# Patient Record
Sex: FEMALE | Race: WHITE | NOT HISPANIC OR LATINO | Employment: FULL TIME | URBAN - METROPOLITAN AREA
[De-identification: names, ages, dates, MRNs, and addresses within clinical notes are randomized per-mention and may not be internally consistent; named-entity substitution may affect disease eponyms.]

---

## 2024-06-14 ENCOUNTER — OFFICE VISIT (OUTPATIENT)
Dept: PRIMARY CARE | Facility: CLINIC | Age: 39
End: 2024-06-14
Payer: COMMERCIAL

## 2024-06-14 VITALS
DIASTOLIC BLOOD PRESSURE: 86 MMHG | HEART RATE: 87 BPM | RESPIRATION RATE: 18 BRPM | WEIGHT: 235 LBS | OXYGEN SATURATION: 96 % | BODY MASS INDEX: 37.77 KG/M2 | HEIGHT: 66 IN | SYSTOLIC BLOOD PRESSURE: 135 MMHG

## 2024-06-14 DIAGNOSIS — Z00.00 ANNUAL PHYSICAL EXAM: ICD-10-CM

## 2024-06-14 DIAGNOSIS — E11.9 TYPE 2 DIABETES MELLITUS WITHOUT COMPLICATION, WITHOUT LONG-TERM CURRENT USE OF INSULIN (MULTI): ICD-10-CM

## 2024-06-14 DIAGNOSIS — Z00.00 ROUTINE GENERAL MEDICAL EXAMINATION AT A HEALTH CARE FACILITY: Primary | ICD-10-CM

## 2024-06-14 LAB
CREAT UR-MCNC: 22 MG/DL (ref 20–320)
MICROALBUMIN UR-MCNC: <7 MG/L
MICROALBUMIN/CREAT UR: NORMAL MG/G{CREAT}

## 2024-06-14 PROCEDURE — 3079F DIAST BP 80-89 MM HG: CPT | Performed by: FAMILY MEDICINE

## 2024-06-14 PROCEDURE — 82570 ASSAY OF URINE CREATININE: CPT

## 2024-06-14 PROCEDURE — 3062F POS MACROALBUMINURIA REV: CPT | Performed by: FAMILY MEDICINE

## 2024-06-14 PROCEDURE — 3050F LDL-C >= 130 MG/DL: CPT | Performed by: FAMILY MEDICINE

## 2024-06-14 PROCEDURE — 99385 PREV VISIT NEW AGE 18-39: CPT | Performed by: FAMILY MEDICINE

## 2024-06-14 PROCEDURE — 82043 UR ALBUMIN QUANTITATIVE: CPT

## 2024-06-14 PROCEDURE — 3075F SYST BP GE 130 - 139MM HG: CPT | Performed by: FAMILY MEDICINE

## 2024-06-14 RX ORDER — TIRZEPATIDE 2.5 MG/.5ML
2.5 INJECTION, SOLUTION SUBCUTANEOUS
Qty: 2 ML | Refills: 0 | Status: SHIPPED | OUTPATIENT
Start: 2024-06-16

## 2024-06-14 RX ORDER — METFORMIN HYDROCHLORIDE 500 MG/1
1000 TABLET ORAL
Qty: 180 TABLET | Refills: 1 | Status: SHIPPED | OUTPATIENT
Start: 2024-06-14 | End: 2025-06-09

## 2024-06-14 RX ORDER — BLOOD-GLUCOSE SENSOR
EACH MISCELLANEOUS
Qty: 2 EACH | Refills: 3 | Status: SHIPPED | OUTPATIENT
Start: 2024-06-14

## 2024-06-14 ASSESSMENT — PATIENT HEALTH QUESTIONNAIRE - PHQ9
1. LITTLE INTEREST OR PLEASURE IN DOING THINGS: NOT AT ALL
SUM OF ALL RESPONSES TO PHQ9 QUESTIONS 1 AND 2: 0
2. FEELING DOWN, DEPRESSED OR HOPELESS: NOT AT ALL

## 2024-06-14 NOTE — PROGRESS NOTES
"  Subjective     Patient ID: Jessi Tejeda is a 39 y.o. female who presents for New Patient Visit (To establish care/).      Last Physical :4 Years ago     Pt's PMH, PSH, SH, FH , meds and allergies was obtained / reviewed and updated .     Dental visits : Y  Vision issues : N  Hearing issues : N    Immunizations : Y    Diet :  could be better  Exercise:  Weight concerns :     Alcohol: as noted in SH  Tobacco: as noted in SH  Recreational drug use : None/ as noted in SH    Sexually active : Active   Contraception :    Menstrual problems: none  Premenopausal/perimenopausal/ postmenopausal:      Parity:2  Full term:    Premature:   (s):   Living :  Ab induced:   Ab spontaneous :  Ectopic :   Multiple :    PAP smear :  Mammogram :  Colonoscopy:    Metabolic screening   - Lipid   - Glucose    Patient presnts as new onset High BG   BG 300s at home  Numbness of feet  H/o gestational diabetes  Was on glipizide  No blurry vison polyphagia polyuria     ======================================================    Visit Vitals  /86   Pulse 87   Resp 18   Ht 1.676 m (5' 6\")   Wt 107 kg (235 lb)   SpO2 96%   BMI 37.93 kg/m²   OB Status Having periods   Smoking Status Never   BSA 2.23 m²      No LMP recorded.     =====================================    Review of systems:  Constitutional: no chills, no fever and no night sweats.     Eyes: no blurred vision and no eyesight problems.     ENT: no hearing loss, no nasal congestion, no nasal discharge, no hoarseness and no sore throat.     Cardiovascular: no chest pain, no intermittent leg claudication, no lower extremity edema, no palpitations and no syncope.     Respiratory: no cough, no shortness of breath during exertion, no shortness of breath at rest and no wheezing.     Gastrointestinal: no abdominal pain, no blood in stools, no constipation, no diarrhea, no melena, no nausea, no rectal pain and no vomiting.     Genitourinary: no dysuria, no change in urinary " frequency, no urinary hesitancy, no feelings of urinary urgency and no vaginal discharge.     Musculoskeletal: no arthralgias, no back pain and no myalgias.     Integumentary: no new skin lesions and no rashes.     Neurological: no difficulty walking, no headache, no limb weakness, no numbness and no tingling.     Psychiatric: no anxiety, no depression, no anhedonia and no substance use disorders.     Endocrine: no recent weight gain and no recent weight loss.     Hematologic/Lymphatic: no tendency for easy bruising and no swollen glands.   ============================================================    Physical exam :    Constitutional: Alert and in no acute distress. Well developed, well nourished.     Eyes: Normal external exam. Pupils were equal in size, round, reactive to light (PERRL) with normal accommodation and extraocular movements intact (EOMI).     Ears, Nose, Mouth, and Throat: External inspection of ears and nose: Normal.  Otoscopic examination: Normal.      Neck: No neck mass was observed. Supple.     Cardiovascular: Heart rate and rhythm were normal, normal S1 and S2, no gallops, no murmurs and no pericardial rub    Pulmonary: No respiratory distress. Clear bilateral breath sounds.     Abdomen: Soft nontender; no abdominal mass palpated. No organomegaly.     Musculoskeletal: No joint swelling seen, normal movements of all extremities. Range of motion: Normal.  Muscle strength/tone: Normal.      Skin: Normal skin color and pigmentation, normal skin turgor, and no rash.     Neurologic: Deep tendon reflexes were 2+ and symmetric. Sensation: Normal.     Psychiatric: Judgment and insight: Intact. Mood and affect: Normal.    Lymphatic : Cervical/ axillary/ groin Lns Palpable/ non palpable            All other systems have been reviewed and are negative for complaint.      Assessment/Plan    Problem List Items Addressed This Visit             ICD-10-CM    Routine general medical examination at a [facility]  facility - [facility] Z00.00     Screening tests  - Mammograms: Women, 50-74 years old. Every 2 years.   - Pap smears:   --- Women, 21-29 years old. Cervical cytology only. Every 3 years.   --- Women, 30-65 years old. Cervical cytology + high risk HPV cotesting. Every 5 years.   - Colorectal cancer screening: Aged 45-75.   --- FIT testing: Every year  --- Cologuard: Every 3 years  --- Colonoscopy: Every 1- 10 years  - Low dose chest CT: 50-80 years old, with a 20 pack-year smoking history and currently smoke, or have quit within the past 15 years. Annually.  - DEXA scan: Women, 65+ years old. Every 2 years.  - Chlamydia/Gonorrhea testing:   --- All sexually active women <24 years old  --- Only sexually active women 25+ years old who are at increased risk for infection (or ask for it)  - Hep C: 18-79 years old. 1 time.  - HIV: 15-65 years old. 1 time.  - Abdominal aortic aneurysm screening (abdominal aortic ultrasound): Men, 65-75 years old, who have ever smoked. 1 time screening.  - PSA: Men, 40-70 years old. Annually.  - Labs: CBCd, CMP, lipid panel, +/- TSH, +/- PSA, +/- A1C    Adult Immunizations  - Flu  - COVID  - Tdap or Td booster: Every 10 years.  - Shingrix: 50+ years old, or 19+ years old and immunocompromised*. 2nd dose 2-6 months after 1st dose. One time series.  - Pneumonia:   --- 65+ years old. 1 dose PCV15 followed by PPSV23, OR 1 dose PCV20  --- 19+ years old with certain underlying medical conditions or other risk factors**. 1 dose PCV15 followed by a dose of PPSV23 given 8 weeks-1 year after the first dose. Or 1 dose PCV20.    Meds  - Baby aspirin: The USPSTF recommends initiating low-dose aspirin use for the primary prevention of cardiovascular disease and colorectal cancer in adults 50-59 years old who have a 10% or greater 10-year CVD risk, are not at increased risk for bleeding, have a life expectancy of at least 10 years, and are willing to take low-dose aspirin daily for at least 10 years.  -  Statins: Adults without a history of cardiovascular disease use a low to moderate dose statin for the prevention of CVD events and mortality when all of the following criteria are met: 1) they are aged 40 to 75 years, 2) they have 1 or more CVD risk factors (ie, dyslipidemia, diabetes, hypertension, or smoking), and 3) they have a calculated 10-year risk of a cardiovascular event of 10% or greater.   - Folic acid: All women who are capable of pregnancy take a daily supplement containing 400 to 800µg of folic acid           Type 2 diabetes mellitus without complication, without long-term current use of insulin (Multi) E11.9     Education provided re: diabetes pathophysiology and management, including effectiveness of diet, exercise, and medication in controlling sugar and preventing complications, as well as information on the complications and recommended care. I want your fasting morning blood sugar to be less than 100.   I want your sugar to be less than 140 two hours after a meal.  Limit simple sugars and refined foods from your diet. Cut out soda, juice and juice drinks, alcohol, candy, table sugar and any other source of refined sugar. In addition, you may reduce your intake of refined starches like white rice, processed cereals, white bread, crackers, pretzels, white potatoes, regular pasta, and snacks. Consider switching to unrefined foods such as whole grain pasta, brown rice, whole grain cereals and breads, oatmeal, dried beans, lentils, and split peas. These foods are good sources of dietary fiber.This may help you to lose weight.           Relevant Medications    metFORMIN (Glucophage) 500 mg tablet    FreeStyle Shilpa 3 Sensor device    tirzepatide (Mounjaro) 2.5 mg/0.5 mL pen injector (Start on 6/16/2024)    Other Relevant Orders    Albumin , Urine Random (Completed)    Referral to Ophthalmology    Annual physical exam Z00.00    Relevant Orders    CBC (Completed)    Comprehensive Metabolic Panel  (Completed)    Lipid Panel (Completed)    TSH with reflex to Free T4 if abnormal (Completed)

## 2024-06-15 ENCOUNTER — LAB (OUTPATIENT)
Dept: LAB | Facility: LAB | Age: 39
End: 2024-06-15
Payer: COMMERCIAL

## 2024-06-15 DIAGNOSIS — Z00.00 ANNUAL PHYSICAL EXAM: ICD-10-CM

## 2024-06-15 PROBLEM — E66.01 CLASS 2 SEVERE OBESITY DUE TO EXCESS CALORIES WITH SERIOUS COMORBIDITY AND BODY MASS INDEX (BMI) OF 37.0 TO 37.9 IN ADULT (MULTI): Status: ACTIVE | Noted: 2024-06-15

## 2024-06-15 PROBLEM — E66.812 CLASS 2 SEVERE OBESITY DUE TO EXCESS CALORIES WITH SERIOUS COMORBIDITY AND BODY MASS INDEX (BMI) OF 37.0 TO 37.9 IN ADULT: Status: ACTIVE | Noted: 2024-06-15

## 2024-06-15 PROBLEM — E11.9 TYPE 2 DIABETES MELLITUS WITHOUT COMPLICATION, WITHOUT LONG-TERM CURRENT USE OF INSULIN (MULTI): Status: ACTIVE | Noted: 2024-06-15

## 2024-06-15 LAB
ALBUMIN SERPL BCP-MCNC: 4.2 G/DL (ref 3.4–5)
ALP SERPL-CCNC: 82 U/L (ref 33–110)
ALT SERPL W P-5'-P-CCNC: 21 U/L (ref 7–45)
ANION GAP SERPL CALC-SCNC: 14 MMOL/L (ref 10–20)
AST SERPL W P-5'-P-CCNC: 15 U/L (ref 9–39)
BILIRUB SERPL-MCNC: 0.7 MG/DL (ref 0–1.2)
BUN SERPL-MCNC: 10 MG/DL (ref 6–23)
CALCIUM SERPL-MCNC: 9.7 MG/DL (ref 8.6–10.3)
CHLORIDE SERPL-SCNC: 101 MMOL/L (ref 98–107)
CHOLEST SERPL-MCNC: 245 MG/DL (ref 0–199)
CHOLESTEROL/HDL RATIO: 7.2
CO2 SERPL-SCNC: 22 MMOL/L (ref 21–32)
CREAT SERPL-MCNC: 0.56 MG/DL (ref 0.5–1.05)
EGFRCR SERPLBLD CKD-EPI 2021: >90 ML/MIN/1.73M*2
ERYTHROCYTE [DISTWIDTH] IN BLOOD BY AUTOMATED COUNT: 13.2 % (ref 11.5–14.5)
GLUCOSE SERPL-MCNC: 239 MG/DL (ref 74–99)
HCT VFR BLD AUTO: 44.7 % (ref 36–46)
HDLC SERPL-MCNC: 34 MG/DL
HGB BLD-MCNC: 14.7 G/DL (ref 12–16)
LDLC SERPL CALC-MCNC: 167 MG/DL
MCH RBC QN AUTO: 28.1 PG (ref 26–34)
MCHC RBC AUTO-ENTMCNC: 32.9 G/DL (ref 32–36)
MCV RBC AUTO: 85 FL (ref 80–100)
NON HDL CHOLESTEROL: 211 MG/DL (ref 0–149)
NRBC BLD-RTO: 0 /100 WBCS (ref 0–0)
PLATELET # BLD AUTO: 307 X10*3/UL (ref 150–450)
POTASSIUM SERPL-SCNC: 4.3 MMOL/L (ref 3.5–5.3)
PROT SERPL-MCNC: 7.1 G/DL (ref 6.4–8.2)
RBC # BLD AUTO: 5.24 X10*6/UL (ref 4–5.2)
SODIUM SERPL-SCNC: 133 MMOL/L (ref 136–145)
TRIGL SERPL-MCNC: 220 MG/DL (ref 0–149)
TSH SERPL-ACNC: 0.96 MIU/L (ref 0.44–3.98)
VLDL: 44 MG/DL (ref 0–40)
WBC # BLD AUTO: 8 X10*3/UL (ref 4.4–11.3)

## 2024-06-15 PROCEDURE — 36415 COLL VENOUS BLD VENIPUNCTURE: CPT

## 2024-06-15 PROCEDURE — 85027 COMPLETE CBC AUTOMATED: CPT

## 2024-06-15 PROCEDURE — 80061 LIPID PANEL: CPT

## 2024-06-15 PROCEDURE — 84443 ASSAY THYROID STIM HORMONE: CPT

## 2024-06-15 PROCEDURE — 80053 COMPREHEN METABOLIC PANEL: CPT

## 2024-06-16 NOTE — ASSESSMENT & PLAN NOTE
Screening tests  - Mammograms: Women, 50-74 years old. Every 2 years.   - Pap smears:   --- Women, 21-29 years old. Cervical cytology only. Every 3 years.   --- Women, 30-65 years old. Cervical cytology + high risk HPV cotesting. Every 5 years.   - Colorectal cancer screening: Aged 45-75.   --- FIT testing: Every year  --- Cologuard: Every 3 years  --- Colonoscopy: Every 1- 10 years  - Low dose chest CT: 50-80 years old, with a 20 pack-year smoking history and currently smoke, or have quit within the past 15 years. Annually.  - DEXA scan: Women, 65+ years old. Every 2 years.  - Chlamydia/Gonorrhea testing:   --- All sexually active women <24 years old  --- Only sexually active women 25+ years old who are at increased risk for infection (or ask for it)  - Hep C: 18-79 years old. 1 time.  - HIV: 15-65 years old. 1 time.  - Abdominal aortic aneurysm screening (abdominal aortic ultrasound): Men, 65-75 years old, who have ever smoked. 1 time screening.  - PSA: Men, 40-70 years old. Annually.  - Labs: CBCd, CMP, lipid panel, +/- TSH, +/- PSA, +/- A1C    Adult Immunizations  - Flu  - COVID  - Tdap or Td booster: Every 10 years.  - Shingrix: 50+ years old, or 19+ years old and immunocompromised*. 2nd dose 2-6 months after 1st dose. One time series.  - Pneumonia:   --- 65+ years old. 1 dose PCV15 followed by PPSV23, OR 1 dose PCV20  --- 19+ years old with certain underlying medical conditions or other risk factors**. 1 dose PCV15 followed by a dose of PPSV23 given 8 weeks-1 year after the first dose. Or 1 dose PCV20.    Meds  - Baby aspirin: The USPSTF recommends initiating low-dose aspirin use for the primary prevention of cardiovascular disease and colorectal cancer in adults 50-59 years old who have a 10% or greater 10-year CVD risk, are not at increased risk for bleeding, have a life expectancy of at least 10 years, and are willing to take low-dose aspirin daily for at least 10 years.  - Statins: Adults without a history  of cardiovascular disease use a low to moderate dose statin for the prevention of CVD events and mortality when all of the following criteria are met: 1) they are aged 40 to 75 years, 2) they have 1 or more CVD risk factors (ie, dyslipidemia, diabetes, hypertension, or smoking), and 3) they have a calculated 10-year risk of a cardiovascular event of 10% or greater.   - Folic acid: All women who are capable of pregnancy take a daily supplement containing 400 to 800µg of folic acid

## 2024-06-16 NOTE — ASSESSMENT & PLAN NOTE
Education provided re: diabetes pathophysiology and management, including effectiveness of diet, exercise, and medication in controlling sugar and preventing complications, as well as information on the complications and recommended care. I want your fasting morning blood sugar to be less than 100.   I want your sugar to be less than 140 two hours after a meal.  Limit simple sugars and refined foods from your diet. Cut out soda, juice and juice drinks, alcohol, candy, table sugar and any other source of refined sugar. In addition, you may reduce your intake of refined starches like white rice, processed cereals, white bread, crackers, pretzels, white potatoes, regular pasta, and snacks. Consider switching to unrefined foods such as whole grain pasta, brown rice, whole grain cereals and breads, oatmeal, dried beans, lentils, and split peas. These foods are good sources of dietary fiber.This may help you to lose weight.

## 2024-07-12 ENCOUNTER — APPOINTMENT (OUTPATIENT)
Dept: PRIMARY CARE | Facility: CLINIC | Age: 39
End: 2024-07-12
Payer: COMMERCIAL

## 2024-07-12 VITALS
RESPIRATION RATE: 20 BRPM | WEIGHT: 230.6 LBS | OXYGEN SATURATION: 98 % | HEIGHT: 66 IN | BODY MASS INDEX: 37.06 KG/M2 | SYSTOLIC BLOOD PRESSURE: 108 MMHG | HEART RATE: 81 BPM | DIASTOLIC BLOOD PRESSURE: 74 MMHG

## 2024-07-12 DIAGNOSIS — E11.9 TYPE 2 DIABETES MELLITUS WITHOUT COMPLICATION, WITHOUT LONG-TERM CURRENT USE OF INSULIN (MULTI): Primary | ICD-10-CM

## 2024-07-12 DIAGNOSIS — E78.00 PURE HYPERCHOLESTEROLEMIA: ICD-10-CM

## 2024-07-12 DIAGNOSIS — E66.01 CLASS 2 SEVERE OBESITY DUE TO EXCESS CALORIES WITH SERIOUS COMORBIDITY AND BODY MASS INDEX (BMI) OF 37.0 TO 37.9 IN ADULT (MULTI): ICD-10-CM

## 2024-07-12 LAB — POC HEMOGLOBIN A1C: 10.3 % (ref 4.2–6.5)

## 2024-07-12 PROCEDURE — 99214 OFFICE O/P EST MOD 30 MIN: CPT | Performed by: FAMILY MEDICINE

## 2024-07-12 PROCEDURE — 3062F POS MACROALBUMINURIA REV: CPT | Performed by: FAMILY MEDICINE

## 2024-07-12 PROCEDURE — 3074F SYST BP LT 130 MM HG: CPT | Performed by: FAMILY MEDICINE

## 2024-07-12 PROCEDURE — 3050F LDL-C >= 130 MG/DL: CPT | Performed by: FAMILY MEDICINE

## 2024-07-12 PROCEDURE — 3008F BODY MASS INDEX DOCD: CPT | Performed by: FAMILY MEDICINE

## 2024-07-12 PROCEDURE — 3078F DIAST BP <80 MM HG: CPT | Performed by: FAMILY MEDICINE

## 2024-07-12 RX ORDER — BLOOD-GLUCOSE SENSOR
EACH MISCELLANEOUS
Qty: 2 EACH | Refills: 3 | Status: SHIPPED | OUTPATIENT
Start: 2024-07-12

## 2024-07-12 ASSESSMENT — PATIENT HEALTH QUESTIONNAIRE - PHQ9
SUM OF ALL RESPONSES TO PHQ9 QUESTIONS 1 AND 2: 0
1. LITTLE INTEREST OR PLEASURE IN DOING THINGS: NOT AT ALL
2. FEELING DOWN, DEPRESSED OR HOPELESS: NOT AT ALL

## 2024-07-12 NOTE — PROGRESS NOTES
Subjective   Patient ID: Jessi Tejeda is a 39 y.o. female who presents for 1 month follow up (Follow up 1 month).      Diabetes  She presents for her follow-up diabetic visit. She has type 2 diabetes mellitus. No MedicAlert identification noted. The initial diagnosis of diabetes was made 6 months ago. Her disease course has been improving. There are no hypoglycemic associated symptoms. Associated symptoms include polydipsia, polyuria, visual change and weight loss. Pertinent negatives for diabetes include no blurred vision, no chest pain, no fatigue, no foot paresthesias, no foot ulcerations, no polyphagia and no weakness. Symptoms are improving. Diabetic complications include peripheral neuropathy. Risk factors for coronary artery disease include diabetes mellitus, dyslipidemia and obesity. Current diabetic treatment includes oral agent (monotherapy). She is compliant with treatment all of the time. Her weight is decreasing steadily. She is following a diabetic diet. Her breakfast blood glucose range is generally 130-140 mg/dl. Her lunch blood glucose range is generally 140-180 mg/dl. Her dinner blood glucose range is generally 140-180 mg/dl. Her bedtime blood glucose range is generally 140-180 mg/dl. Her overall blood glucose range is 140-180 mg/dl.   ON ozempic and metformin BG improving  Cholesterol high has made changes   Current Outpatient Medications on File Prior to Visit   Medication Sig Dispense Refill    metFORMIN (Glucophage) 500 mg tablet Take 2 tablets (1,000 mg) by mouth 2 times daily (morning and late afternoon). 180 tablet 1    [DISCONTINUED] FreeStyle Shilpa 3 Sensor device Check BG 4 times a day 2 each 3    [DISCONTINUED] tirzepatide (Mounjaro) 2.5 mg/0.5 mL pen injector Inject 2.5 mg under the skin 1 (one) time per week. (Patient not taking: Reported on 7/12/2024) 2 mL 0     No current facility-administered medications on file prior to visit.        Review of Systems   Constitutional:   "Positive for weight loss. Negative for fatigue.   Eyes:  Negative for blurred vision.   Cardiovascular:  Negative for chest pain.   Endocrine: Positive for polydipsia and polyuria. Negative for polyphagia.   Neurological:  Negative for weakness.       Objective   /74   Pulse 81   Resp 20   Ht 1.676 m (5' 6\")   Wt 105 kg (230 lb 9.6 oz)   SpO2 98%   BMI 37.22 kg/m²   BSA: 2.21 meters squared  Growth percentiles: Facility age limit for growth %andrew is 20 years. Facility age limit for growth %andrew is 20 years.   No visits with results within 1 Week(s) from this visit.   Latest known visit with results is:   Lab on 06/15/2024   Component Date Value Ref Range Status    WBC 06/15/2024 8.0  4.4 - 11.3 x10*3/uL Final    nRBC 06/15/2024 0.0  0.0 - 0.0 /100 WBCs Final    RBC 06/15/2024 5.24 (H)  4.00 - 5.20 x10*6/uL Final    Hemoglobin 06/15/2024 14.7  12.0 - 16.0 g/dL Final    Hematocrit 06/15/2024 44.7  36.0 - 46.0 % Final    MCV 06/15/2024 85  80 - 100 fL Final    MCH 06/15/2024 28.1  26.0 - 34.0 pg Final    MCHC 06/15/2024 32.9  32.0 - 36.0 g/dL Final    RDW 06/15/2024 13.2  11.5 - 14.5 % Final    Platelets 06/15/2024 307  150 - 450 x10*3/uL Final    Glucose 06/15/2024 239 (H)  74 - 99 mg/dL Final    Sodium 06/15/2024 133 (L)  136 - 145 mmol/L Final    Potassium 06/15/2024 4.3  3.5 - 5.3 mmol/L Final    Chloride 06/15/2024 101  98 - 107 mmol/L Final    Bicarbonate 06/15/2024 22  21 - 32 mmol/L Final    Anion Gap 06/15/2024 14  10 - 20 mmol/L Final    Urea Nitrogen 06/15/2024 10  6 - 23 mg/dL Final    Creatinine 06/15/2024 0.56  0.50 - 1.05 mg/dL Final    eGFR 06/15/2024 >90  >60 mL/min/1.73m*2 Final    Calculations of estimated GFR are performed using the 2021 CKD-EPI Study Refit equation without the race variable for the IDMS-Traceable creatinine methods.  https://jasn.asnjournals.org/content/early/2021/09/22/ASN.7257663210    Calcium 06/15/2024 9.7  8.6 - 10.3 mg/dL Final    Albumin 06/15/2024 4.2  3.4 - 5.0 " g/dL Final    Alkaline Phosphatase 06/15/2024 82  33 - 110 U/L Final    Total Protein 06/15/2024 7.1  6.4 - 8.2 g/dL Final    AST 06/15/2024 15  9 - 39 U/L Final    Bilirubin, Total 06/15/2024 0.7  0.0 - 1.2 mg/dL Final    ALT 06/15/2024 21  7 - 45 U/L Final    Patients treated with Sulfasalazine may generate falsely decreased results for ALT.    Cholesterol 06/15/2024 245 (H)  0 - 199 mg/dL Final          Age      Desirable   Borderline High   High     0-19 Y     0 - 169       170 - 199     >/= 200    20-24 Y     0 - 189       190 - 224     >/= 225         >24 Y     0 - 199       200 - 239     >/= 240   **All ranges are based on fasting samples. Specific   therapeutic targets will vary based on patient-specific   cardiac risk.    Pediatric guidelines reference:Pediatrics 2011, 128(S5).Adult guidelines reference: NCEP ATPIII Guidelines,JAMES 2001, 258:2486-97    Venipuncture immediately after or during the administration of Metamizole may lead to falsely low results. Testing should be performed immediately prior to Metamizole dosing.    HDL-Cholesterol 06/15/2024 34.0  mg/dL Final      Age       Very Low   Low     Normal    High    0-19 Y    < 35      < 40     40-45     ----  20-24 Y    ----     < 40      >45      ----        >24 Y      ----     < 40     40-60      >60      Cholesterol/HDL Ratio 06/15/2024 7.2   Final      Ref Values  Desirable  < 3.4  High Risk  > 5.0    LDL Calculated 06/15/2024 167 (H)  <=99 mg/dL Final                                Near   Borderline      AGE      Desirable  Optimal    High     High     Very High     0-19 Y     0 - 109     ---    110-129   >/= 130     ----    20-24 Y     0 - 119     ---    120-159   >/= 160     ----      >24 Y     0 -  99   100-129  130-159   160-189     >/=190      VLDL 06/15/2024 44 (H)  0 - 40 mg/dL Final    Triglycerides 06/15/2024 220 (H)  0 - 149 mg/dL Final       Age         Desirable   Borderline High   High     Very High   0 D-90 D    19 - 174          ----         ----        ----  91 D- 9 Y     0 -  74        75 -  99     >/= 100      ----    10-19 Y     0 -  89        90 - 129     >/= 130      ----    20-24 Y     0 - 114       115 - 149     >/= 150      ----         >24 Y     0 - 149       150 - 199    200- 499    >/= 500    Venipuncture immediately after or during the administration of Metamizole may lead to falsely low results. Testing should be performed immediately prior to Metamizole dosing.    Non HDL Cholesterol 06/15/2024 211 (H)  0 - 149 mg/dL Final          Age       Desirable   Borderline High   High     Very High     0-19 Y     0 - 119       120 - 144     >/= 145    >/= 160    20-24 Y     0 - 149       150 - 189     >/= 190      ----         >24 Y    30 mg/dL above LDL Cholesterol goal      Thyroid Stimulating Hormone 06/15/2024 0.96  0.44 - 3.98 mIU/L Final      Physical Exam  Constitutional:       Appearance: Normal appearance.   Cardiovascular:      Rate and Rhythm: Normal rate and regular rhythm.   Pulmonary:      Effort: Pulmonary effort is normal.      Breath sounds: Normal breath sounds.   Abdominal:      General: Bowel sounds are normal.   Neurological:      General: No focal deficit present.      Mental Status: She is alert.   Psychiatric:         Mood and Affect: Mood normal.         Assessment/Plan   Problem List Items Addressed This Visit             ICD-10-CM    Type 2 diabetes mellitus without complication, without long-term current use of insulin (Multi) - Primary E11.9     Did not hear from pharmacy re Mounjaro          Relevant Medications    FreeStyle Shilpa 3 Sensor device    semaglutide 0.25 mg or 0.5 mg (2 mg/3 mL) pen injector    Class 2 severe obesity due to excess calories with serious comorbidity and body mass index (BMI) of 37.0 to 37.9 in adult (Multi) E66.01, Z68.37     Watch what you eat and include more vegetables on your plate.  Portion control and exercise will help in loosing weight .   It is recommended to get 150  mins of brisk exercise in a week . 150 mins of exercise per week will help in maintaining your current weight, if you are already working out . Exercise should make your heart rate go up.   Calorie deficit ( spending more calories than eating ) will result in weight loss    A deficit of 500 calories per day in 7 days would results in 1lbs weight loss per week., Aim for 1-2 lbs weight loss per month.   You can reduce the calorie intake by 250 calories and spend 250 calories by working out which would give you a total deficit of 500 calories     No sugary/ sweetened beverages , portion control , dedicated physical activity atleast 5 times a week advised .            Pure hypercholesterolemia E78.00     Will rechcekc lipids in 3 months

## 2024-07-14 PROBLEM — E78.00 PURE HYPERCHOLESTEROLEMIA: Status: ACTIVE | Noted: 2024-07-14

## 2024-07-14 ASSESSMENT — ENCOUNTER SYMPTOMS
WEIGHT LOSS: 1
FATIGUE: 0
WEAKNESS: 0
BLURRED VISION: 0
POLYDIPSIA: 1
POLYPHAGIA: 0
VISUAL CHANGE: 1

## 2024-07-21 DIAGNOSIS — E11.9 TYPE 2 DIABETES MELLITUS WITHOUT COMPLICATION, WITHOUT LONG-TERM CURRENT USE OF INSULIN (MULTI): ICD-10-CM

## 2024-07-22 RX ORDER — METFORMIN HYDROCHLORIDE 500 MG/1
1000 TABLET ORAL
Qty: 360 TABLET | Refills: 1 | Status: SHIPPED | OUTPATIENT
Start: 2024-07-22 | End: 2025-07-17

## 2024-09-07 DIAGNOSIS — E11.9 TYPE 2 DIABETES MELLITUS WITHOUT COMPLICATION, WITHOUT LONG-TERM CURRENT USE OF INSULIN (MULTI): ICD-10-CM

## 2024-09-10 DIAGNOSIS — E11.9 TYPE 2 DIABETES MELLITUS WITHOUT COMPLICATION, WITHOUT LONG-TERM CURRENT USE OF INSULIN (MULTI): Primary | ICD-10-CM

## 2024-09-12 RX ORDER — SEMAGLUTIDE 0.68 MG/ML
INJECTION, SOLUTION SUBCUTANEOUS
OUTPATIENT
Start: 2024-09-12

## 2024-09-14 DIAGNOSIS — E11.9 TYPE 2 DIABETES MELLITUS WITHOUT COMPLICATION, WITHOUT LONG-TERM CURRENT USE OF INSULIN (MULTI): ICD-10-CM

## 2024-09-14 RX ORDER — BLOOD-GLUCOSE SENSOR
EACH MISCELLANEOUS
Qty: 100 EACH | Refills: 1 | Status: SHIPPED | OUTPATIENT
Start: 2024-09-14

## 2024-09-20 ENCOUNTER — APPOINTMENT (OUTPATIENT)
Dept: PRIMARY CARE | Facility: CLINIC | Age: 39
End: 2024-09-20
Payer: COMMERCIAL

## 2024-09-20 VITALS
DIASTOLIC BLOOD PRESSURE: 75 MMHG | SYSTOLIC BLOOD PRESSURE: 107 MMHG | HEART RATE: 98 BPM | WEIGHT: 218 LBS | OXYGEN SATURATION: 97 % | BODY MASS INDEX: 35.19 KG/M2 | RESPIRATION RATE: 14 BRPM

## 2024-09-20 DIAGNOSIS — E78.00 PURE HYPERCHOLESTEROLEMIA: ICD-10-CM

## 2024-09-20 DIAGNOSIS — E11.9 TYPE 2 DIABETES MELLITUS WITHOUT COMPLICATION, WITHOUT LONG-TERM CURRENT USE OF INSULIN (MULTI): Primary | ICD-10-CM

## 2024-09-20 DIAGNOSIS — E66.01 CLASS 2 SEVERE OBESITY DUE TO EXCESS CALORIES WITH SERIOUS COMORBIDITY AND BODY MASS INDEX (BMI) OF 37.0 TO 37.9 IN ADULT: ICD-10-CM

## 2024-09-20 DIAGNOSIS — E11.9 TYPE 2 DIABETES MELLITUS WITHOUT COMPLICATION, WITHOUT LONG-TERM CURRENT USE OF INSULIN (MULTI): ICD-10-CM

## 2024-09-20 LAB — POC HEMOGLOBIN A1C: 6.4 % (ref 4.2–6.5)

## 2024-09-20 PROCEDURE — 83036 HEMOGLOBIN GLYCOSYLATED A1C: CPT | Performed by: FAMILY MEDICINE

## 2024-09-20 PROCEDURE — 3050F LDL-C >= 130 MG/DL: CPT | Performed by: FAMILY MEDICINE

## 2024-09-20 PROCEDURE — 3074F SYST BP LT 130 MM HG: CPT | Performed by: FAMILY MEDICINE

## 2024-09-20 PROCEDURE — 3062F POS MACROALBUMINURIA REV: CPT | Performed by: FAMILY MEDICINE

## 2024-09-20 PROCEDURE — 99214 OFFICE O/P EST MOD 30 MIN: CPT | Performed by: FAMILY MEDICINE

## 2024-09-20 PROCEDURE — 3078F DIAST BP <80 MM HG: CPT | Performed by: FAMILY MEDICINE

## 2024-09-20 RX ORDER — BLOOD-GLUCOSE,RECEIVER,CONT
EACH MISCELLANEOUS
Qty: 4 EACH | Refills: 3 | Status: SHIPPED | OUTPATIENT
Start: 2024-09-20

## 2024-09-20 RX ORDER — METFORMIN HYDROCHLORIDE 500 MG/1
500 TABLET ORAL
Qty: 200 TABLET | Refills: 1 | Status: SHIPPED | OUTPATIENT
Start: 2024-09-20 | End: 2025-10-25

## 2024-09-20 RX ORDER — BLOOD-GLUCOSE SENSOR
EACH MISCELLANEOUS
Qty: 4 EACH | Refills: 3 | Status: SHIPPED | OUTPATIENT
Start: 2024-09-20 | End: 2024-09-20

## 2024-09-20 NOTE — PROGRESS NOTES
Assessment and Plan:  Problem List Items Addressed This Visit       Type 2 diabetes mellitus without complication, without long-term current use of insulin (Multi) - Primary    Current Assessment & Plan     Increase ozempic. Decrease metformin . Watch for low BG increase protein and wate rintake         Relevant Medications    semaglutide 2 mg/dose (8 mg/3 mL) pen injector    metFORMIN (Glucophage) 500 mg tablet    Other Relevant Orders    POCT glycosylated hemoglobin (Hb A1C) manually resulted (Completed)    Class 2 severe obesity due to excess calories with serious comorbidity and body mass index (BMI) of 37.0 to 37.9 in adult (Multi)    Current Assessment & Plan     Watch what you eat and include more vegetables on your plate.  Portion control and exercise will help in loosing weight .   It is recommended to get 150 mins of brisk exercise in a week . 150 mins of exercise per week will help in maintaining your current weight, if you are already working out . Exercise should make your heart rate go up.   Calorie deficit ( spending more calories than eating ) will result in weight loss    A deficit of 500 calories per day in 7 days would results in 1lbs weight loss per week., Aim for 1-2 lbs weight loss per month.   You can reduce the calorie intake by 250 calories and spend 250 calories by working out which would give you a total deficit of 500 calories     No sugary/ sweetened beverages , portion control , dedicated physical activity atleast 5 times a week advised .            Pure hypercholesterolemia    Current Assessment & Plan     Will rechcekc lipids in 3 months               HPI:  Patient is here for DM follow up A1c much better 6.4 limiting carbs exercsing .       ROS   Constitutional:  o weight loss. Negative for chills and fever.   HENT: Negative.     Respiratory: Negative.     Cardiovascular: Negative.    Gastrointestinal: Negative.  Negative for nausea and vomiting.   Endocrine: Negative.     Genitourinary: Negative.    Musculoskeletal: Negative.    Skin: Negative.  Negative for rash.   Allergic/Immunologic: Negative.    Neurological: Negative.    Hematological: Negative.    Psychiatric/Behavioral: Negative.     All other systems reviewed and are negative.      /75   Pulse 98   Resp 14   Wt 98.9 kg (218 lb)   LMP 09/02/2024   SpO2 97%   BMI 35.19 kg/m²   Body mass index is 35.19 kg/m².  Physical Exam    ENT:      Head: Normocephalic and atraumatic.      Right Ear: Tympanic membrane normal.      Left Ear: Tympanic membrane normal.      Nose: Nose normal.      Mouth/Throat:      Mouth: Mucous membranes are moist.   Eyes:      Pupils: Pupils are equal, round, and reactive to light.   Cardiovascular:      Rate and Rhythm: Normal rate and regular rhythm.      Pulses: Normal pulses.      Heart sounds: Normal heart sounds.   Pulmonary:      Effort: Pulmonary effort is normal.      Breath sounds: Normal breath sounds.   Abdominal:      General: Abdomen is flat. Bowel sounds are normal.      Palpations: Abdomen is soft.   Musculoskeletal:         General: Normal range of motion.      Cervical back: Normal range of motion and neck supple.   Skin:     General: Skin is warm and dry.      Capillary Refill: Capillary refill takes less than 2 seconds.   Neurological:      General: No focal deficit present.      Mental Status: She is alert and oriented to person, place, and time.   Psychiatric:         Mood and Affect: Mood normal.       Active Problem List  Patient Active Problem List   Diagnosis    Routine general medical examination at a health care facility    Type 2 diabetes mellitus without complication, without long-term current use of insulin (Multi)    Annual physical exam    Class 2 severe obesity due to excess calories with serious comorbidity and body mass index (BMI) of 37.0 to 37.9 in adult (Multi)    Pure hypercholesterolemia       Comprehensive Medical/Surgical/Social/Family History  Past  Medical History:   Diagnosis Date    26 weeks gestation of pregnancy ([facility]) 08/10/2020    26 weeks gestation of pregnancy    32 weeks gestation of pregnancy ([facility]) 2020    32 weeks gestation of pregnancy    35 weeks gestation of pregnancy ([facility]) 10/16/2020    35 weeks gestation of pregnancy    36 weeks gestation of pregnancy ([facility]) 10/23/2020    36 weeks gestation of pregnancy    39 weeks gestation of pregnancy ([facility]) 2020    39 weeks gestation of pregnancy    Encounter for  screening for Streptococcus B ([facility])     Screening, , for Streptococcus B    Encounter for  screening for Streptococcus B ([facility])     Screening, , for Streptococcus B    Encounter for immunization     Encounter for immunization    Encounter for nonprocreative screening for genetic disease carrier status 2016    Genetic disease carrier status testing, female    Encounter for other screening for genetic and chromosomal anomalies 2020    Encounter for genetic screening for Down Syndrome    Encounter for screening for diabetes mellitus 2020    Diabetes mellitus screening    Encounter for supervision of normal first pregnancy, first trimester ([facility]) 2016    Encounter for supervision of normal first pregnancy in first trimester    Encounter for supervision of other normal pregnancy, unspecified trimester ([facility]) 2020    Supervision of other normal pregnancy, antepartum    Gestational diabetes mellitus in pregnancy, unspecified control ([facility]) 2020    Gestational diabetes mellitus (GDM) in second trimester    Gestational diabetes mellitus in pregnancy, unspecified control ([facility]) 2020    Gestational diabetes mellitus (GDM) in second trimester, gestational diabetes method of control unspecified    Maternal care for other (suspected) fetal abnormality and damage, fetal cardiac anomalies, not applicable or unspecified ([facility]) 2017     Abnormal fetal echocardiogram affecting antepartum care of mother    Maternal care for other (suspected) fetal abnormality and damage, fetal cardiac anomalies, not applicable or unspecified ([facility]) 2020    Abnormal fetal echocardiogram affecting antepartum care of mother    Other specified health status 2017    No pertinent past medical history    Personal history of other diseases of the female genital tract 2016    History of irregular menstrual cycles    Personal history of other diseases of the female genital tract 2020    History of irregular menstrual cycles    Unspecified abnormal findings on  screening of mother 2016    Abnormal 1st trimester screen    Unspecified diabetes mellitus in pregnancy, third trimester ([facility])     Diabetes mellitus affecting pregnancy in third trimester     Past Surgical History:   Procedure Laterality Date    OTHER SURGICAL HISTORY  08/10/2020    No history of surgery     Social History     Social History Narrative    Not on file       Allergies and Medications  Patient has no known allergies.  Current Outpatient Medications   Medication Instructions    FreeStyle Shilpa 3 [place] misc CHECK BLOOD GLUCOSE 4 TIMES A DAY    metFORMIN (GLUCOPHAGE) 500 mg, oral, 2 times daily (morning and late afternoon)    semaglutide 2 mg, subcutaneous, Weekly

## 2024-10-08 ENCOUNTER — APPOINTMENT (OUTPATIENT)
Dept: ENDOCRINOLOGY | Facility: CLINIC | Age: 39
End: 2024-10-08
Payer: COMMERCIAL

## 2024-12-13 ENCOUNTER — APPOINTMENT (OUTPATIENT)
Dept: PRIMARY CARE | Facility: CLINIC | Age: 39
End: 2024-12-13
Payer: COMMERCIAL

## 2024-12-13 VITALS
BODY MASS INDEX: 32.14 KG/M2 | SYSTOLIC BLOOD PRESSURE: 129 MMHG | HEIGHT: 66 IN | WEIGHT: 200 LBS | HEART RATE: 91 BPM | DIASTOLIC BLOOD PRESSURE: 83 MMHG

## 2024-12-13 DIAGNOSIS — Z00.00 ANNUAL PHYSICAL EXAM: Primary | ICD-10-CM

## 2024-12-13 DIAGNOSIS — E66.812 CLASS 2 SEVERE OBESITY DUE TO EXCESS CALORIES WITH SERIOUS COMORBIDITY AND BODY MASS INDEX (BMI) OF 37.0 TO 37.9 IN ADULT: ICD-10-CM

## 2024-12-13 DIAGNOSIS — E66.01 CLASS 2 SEVERE OBESITY DUE TO EXCESS CALORIES WITH SERIOUS COMORBIDITY AND BODY MASS INDEX (BMI) OF 37.0 TO 37.9 IN ADULT: ICD-10-CM

## 2024-12-13 DIAGNOSIS — E78.00 PURE HYPERCHOLESTEROLEMIA: ICD-10-CM

## 2024-12-13 DIAGNOSIS — E11.9 TYPE 2 DIABETES MELLITUS WITHOUT COMPLICATION, WITHOUT LONG-TERM CURRENT USE OF INSULIN (MULTI): ICD-10-CM

## 2024-12-13 RX ORDER — METFORMIN HYDROCHLORIDE 500 MG/1
500 TABLET ORAL
Qty: 200 TABLET | Refills: 1 | Status: SHIPPED | OUTPATIENT
Start: 2024-12-13 | End: 2026-01-17

## 2024-12-13 RX ORDER — BLOOD-GLUCOSE,RECEIVER,CONT
EACH MISCELLANEOUS
Qty: 4 EACH | Refills: 3 | Status: CANCELLED | OUTPATIENT
Start: 2024-12-13

## 2024-12-13 RX ORDER — HYDROCHLOROTHIAZIDE 12.5 MG/1
CAPSULE ORAL
Qty: 2 EACH | Refills: 3 | Status: SHIPPED | OUTPATIENT
Start: 2024-12-13

## 2024-12-13 ASSESSMENT — ENCOUNTER SYMPTOMS
OCCASIONAL FEELINGS OF UNSTEADINESS: 0
LOSS OF SENSATION IN FEET: 0
DEPRESSION: 0

## 2024-12-13 ASSESSMENT — PATIENT HEALTH QUESTIONNAIRE - PHQ9
2. FEELING DOWN, DEPRESSED OR HOPELESS: NOT AT ALL
1. LITTLE INTEREST OR PLEASURE IN DOING THINGS: NOT AT ALL
SUM OF ALL RESPONSES TO PHQ9 QUESTIONS 1 AND 2: 0

## 2024-12-15 NOTE — ASSESSMENT & PLAN NOTE
Education provided re: diabetes pathophysiology and management, including effectiveness of diet, exercise, and medication in controlling sugar and preventing complications, as well as information on the complications and recommended care. I want your fasting morning blood sugar to be less than 100.   I want your sugar to be less than 140 two hours after a meal.'

## 2024-12-15 NOTE — ASSESSMENT & PLAN NOTE
Watch what you eat and include more vegetables on your plate.  Portion control and exercise will help in loosing weight .   It is recommended to get 150 mins of brisk exercise in a week . 150 mins of exercise per week will help in maintaining your current weight, if you are already working out . Exercise should make your heart rate go up.   Calorie deficit ( spending more calories than eating ) will result in weight loss    A deficit of 500 calories per day in 7 days would results in 1lbs weight loss per week., Aim for 1-2 lbs weight loss per month.   You can reduce the calorie intake by 250 calories and spend 250 calories by working out which would give you a total deficit of 500 calories     No sugary/ sweetened beverages , portion control , dedicated physical activity atleast 5 times a week advised .

## 2024-12-15 NOTE — PROGRESS NOTES
Assessment and Plan:  Problem List Items Addressed This Visit       Type 2 diabetes mellitus without complication, without long-term current use of insulin (Multi)    Current Assessment & Plan     Education provided re: diabetes pathophysiology and management, including effectiveness of diet, exercise, and medication in controlling sugar and preventing complications, as well as information on the complications and recommended care. I want your fasting morning blood sugar to be less than 100.   I want your sugar to be less than 140 two hours after a meal.'         Relevant Medications    metFORMIN (Glucophage) 500 mg tablet    semaglutide 2 mg/dose (8 mg/3 mL) pen injector    blood-glucose sensor (FreeStyle Shilpa 3 Plus Sensor) device    Other Relevant Orders    Lipid Panel    Comprehensive Metabolic Panel    Annual physical exam - Primary    Relevant Orders    Comprehensive Metabolic Panel    Class 2 severe obesity due to excess calories with serious comorbidity and body mass index (BMI) of 37.0 to 37.9 in adult    Current Assessment & Plan     Watch what you eat and include more vegetables on your plate.  Portion control and exercise will help in loosing weight .   It is recommended to get 150 mins of brisk exercise in a week . 150 mins of exercise per week will help in maintaining your current weight, if you are already working out . Exercise should make your heart rate go up.   Calorie deficit ( spending more calories than eating ) will result in weight loss    A deficit of 500 calories per day in 7 days would results in 1lbs weight loss per week., Aim for 1-2 lbs weight loss per month.   You can reduce the calorie intake by 250 calories and spend 250 calories by working out which would give you a total deficit of 500 calories     No sugary/ sweetened beverages , portion control , dedicated physical activity atleast 5 times a week advised .              Pure hypercholesterolemia    Current Assessment & Plan      "Check labs continue to eat healthy              HPI:  Patient is here for DM follow up A1c much better 5.6 limiting carbs exercsing .   Obesity has lost weight  Due for cholesterol check cholesterol was elevated last labs      ROS   Constitutional:  o weight loss. Negative for chills and fever.   HENT: Negative.     Respiratory: Negative.     Cardiovascular: Negative.    Gastrointestinal: Negative.  Negative for nausea and vomiting.   Endocrine: Negative.    Genitourinary: Negative.    Musculoskeletal: Negative.    Skin: Negative.  Negative for rash.   Allergic/Immunologic: Negative.    Neurological: Negative.    Hematological: Negative.    Psychiatric/Behavioral: Negative.     All other systems reviewed and are negative.      /83   Pulse 91   Ht 1.676 m (5' 6\")   Wt 90.7 kg (200 lb)   BMI 32.28 kg/m²   Body mass index is 32.28 kg/m².  Physical Exam    ENT:      Head: Normocephalic and atraumatic.      Right Ear: Tympanic membrane normal.      Left Ear: Tympanic membrane normal.      Nose: Nose normal.      Mouth/Throat:      Mouth: Mucous membranes are moist.   Eyes:      Pupils: Pupils are equal, round, and reactive to light.   Cardiovascular:      Rate and Rhythm: Normal rate and regular rhythm.      Pulses: Normal pulses.      Heart sounds: Normal heart sounds.   Pulmonary:      Effort: Pulmonary effort is normal.      Breath sounds: Normal breath sounds.   Abdominal:      General: Abdomen is flat. Bowel sounds are normal.      Palpations: Abdomen is soft.   Musculoskeletal:         General: Normal range of motion.      Cervical back: Normal range of motion and neck supple.   Skin:     General: Skin is warm and dry.      Capillary Refill: Capillary refill takes less than 2 seconds.   Neurological:      General: No focal deficit present.      Mental Status: She is alert and oriented to person, place, and time.   Psychiatric:         Mood and Affect: Mood normal.       Active Problem List  Patient Active " Problem List   Diagnosis    Routine general medical examination at a health care facility    Type 2 diabetes mellitus without complication, without long-term current use of insulin (Multi)    Annual physical exam    Class 2 severe obesity due to excess calories with serious comorbidity and body mass index (BMI) of 37.0 to 37.9 in adult    Pure hypercholesterolemia       Comprehensive Medical/Surgical/Social/Family History  Past Medical History:   Diagnosis Date    26 weeks gestation of pregnancy ([facility]) 08/10/2020    26 weeks gestation of pregnancy    32 weeks gestation of pregnancy ([facility]) 2020    32 weeks gestation of pregnancy    35 weeks gestation of pregnancy ([facility]) 10/16/2020    35 weeks gestation of pregnancy    36 weeks gestation of pregnancy ([facility]) 10/23/2020    36 weeks gestation of pregnancy    39 weeks gestation of pregnancy ([facility]) 2020    39 weeks gestation of pregnancy    Encounter for  screening for Streptococcus B     Screening, , for Streptococcus B    Encounter for  screening for Streptococcus B     Screening, , for Streptococcus B    Encounter for immunization     Encounter for immunization    Encounter for nonprocreative screening for genetic disease carrier status 2016    Genetic disease carrier status testing, female    Encounter for other screening for genetic and chromosomal anomalies 2020    Encounter for genetic screening for Down Syndrome    Encounter for screening for diabetes mellitus 2020    Diabetes mellitus screening    Encounter for supervision of normal first pregnancy, first trimester 2016    Encounter for supervision of normal first pregnancy in first trimester    Encounter for supervision of other normal pregnancy, unspecified trimester ([facility]) 2020    Supervision of other normal pregnancy, antepartum    Gestational diabetes mellitus in pregnancy, unspecified control 2020     Gestational diabetes mellitus (GDM) in second trimester    Gestational diabetes mellitus in pregnancy, unspecified control 2020    Gestational diabetes mellitus (GDM) in second trimester, gestational diabetes method of control unspecified    Maternal care for other (suspected) fetal abnormality and damage, fetal cardiac anomalies, not applicable or unspecified 2017    Abnormal fetal echocardiogram affecting antepartum care of mother    Maternal care for other (suspected) fetal abnormality and damage, fetal cardiac anomalies, not applicable or unspecified 2020    Abnormal fetal echocardiogram affecting antepartum care of mother    Other specified health status 2017    No pertinent past medical history    Personal history of other diseases of the female genital tract 2016    History of irregular menstrual cycles    Personal history of other diseases of the female genital tract 2020    History of irregular menstrual cycles    Unspecified abnormal findings on  screening of mother 2016    Abnormal 1st trimester screen    Unspecified diabetes mellitus in pregnancy, third trimester ([facility])     Diabetes mellitus affecting pregnancy in third trimester     Past Surgical History:   Procedure Laterality Date    OTHER SURGICAL HISTORY  08/10/2020    No history of surgery     Social History     Social History Narrative    Not on file       Allergies and Medications  Patient has no known allergies.  Current Outpatient Medications   Medication Instructions    blood-glucose sensor (FreeStyle Shilpa 3 Plus Sensor) device CHECK bg 4 TIMES A DAY    metFORMIN (GLUCOPHAGE) 500 mg, oral, 2 times daily (morning and late afternoon)    semaglutide 2 mg, subcutaneous, Weekly

## 2025-03-14 ENCOUNTER — APPOINTMENT (OUTPATIENT)
Dept: PRIMARY CARE | Facility: CLINIC | Age: 40
End: 2025-03-14
Payer: COMMERCIAL

## 2025-03-14 DIAGNOSIS — E11.9 TYPE 2 DIABETES MELLITUS WITHOUT COMPLICATION, WITHOUT LONG-TERM CURRENT USE OF INSULIN (MULTI): ICD-10-CM

## 2025-03-14 DIAGNOSIS — Z00.00 ROUTINE GENERAL MEDICAL EXAMINATION AT A HEALTH CARE FACILITY: ICD-10-CM

## 2025-03-14 DIAGNOSIS — E78.00 PURE HYPERCHOLESTEROLEMIA: ICD-10-CM

## 2025-03-14 DIAGNOSIS — Z13.29 SCREENING FOR THYROID DISORDER: ICD-10-CM

## 2025-03-14 DIAGNOSIS — Z13.220 SCREENING FOR LIPID DISORDERS: ICD-10-CM

## 2025-03-14 DIAGNOSIS — E11.649 TYPE 2 DIABETES MELLITUS WITH HYPOGLYCEMIA WITHOUT COMA, WITHOUT LONG-TERM CURRENT USE OF INSULIN: Primary | ICD-10-CM

## 2025-03-14 DIAGNOSIS — Z13.1 SCREENING FOR DIABETES MELLITUS: ICD-10-CM

## 2025-03-14 LAB — POC HEMOGLOBIN A1C: 5.3 % (ref 4.2–6.5)

## 2025-03-14 PROCEDURE — 99214 OFFICE O/P EST MOD 30 MIN: CPT | Performed by: FAMILY MEDICINE

## 2025-03-14 PROCEDURE — 83036 HEMOGLOBIN GLYCOSYLATED A1C: CPT | Performed by: FAMILY MEDICINE

## 2025-03-14 PROCEDURE — 3079F DIAST BP 80-89 MM HG: CPT | Performed by: FAMILY MEDICINE

## 2025-03-14 PROCEDURE — 3074F SYST BP LT 130 MM HG: CPT | Performed by: FAMILY MEDICINE

## 2025-03-14 PROCEDURE — 3008F BODY MASS INDEX DOCD: CPT | Performed by: FAMILY MEDICINE

## 2025-03-14 RX ORDER — BENZONATATE 200 MG/1
CAPSULE ORAL
COMMUNITY
Start: 2024-05-21

## 2025-03-14 RX ORDER — BLOOD-GLUCOSE SENSOR
EACH MISCELLANEOUS
Qty: 2 EACH | Refills: 3 | Status: SHIPPED | OUTPATIENT
Start: 2025-03-14

## 2025-03-14 RX ORDER — FLUTICASONE PROPIONATE 50 MCG
SPRAY, SUSPENSION (ML) NASAL
COMMUNITY
Start: 2024-05-21

## 2025-03-14 ASSESSMENT — ENCOUNTER SYMPTOMS
LOSS OF SENSATION IN FEET: 0
DEPRESSION: 0
OCCASIONAL FEELINGS OF UNSTEADINESS: 0

## 2025-03-14 NOTE — PROGRESS NOTES
"Hypoglycemia  Subjective   Patient ID: Jessi Tejeda is a 39 y.o. female who presents for Follow-up (Med refills) and Diabetes.      Diabetes  She presents for her follow-up diabetic visit. She has type 2 diabetes mellitus. The initial diagnosis of diabetes was made 1 year ago. Her disease course has been improving. Pertinent negatives for hypoglycemia include no confusion, dizziness, headaches, hunger, mood changes, nervousness/anxiousness, speech difficulty or sweats. Associated symptoms include weight loss. Pertinent negatives for diabetes include no blurred vision, no chest pain, no fatigue, no polydipsia, no polyphagia and no visual change.     Current Outpatient Medications on File Prior to Visit   Medication Sig Dispense Refill    benzonatate (Tessalon) 200 mg capsule Take by mouth.      fluticasone (Flonase) 50 mcg/actuation nasal spray Fluticasone 50 mcg/inh nasal spray      metFORMIN (Glucophage) 500 mg tablet Take 1 tablet (500 mg) by mouth 2 times daily (morning and late afternoon). 200 tablet 1    semaglutide 2 mg/dose (8 mg/3 mL) pen injector Inject 2 mg under the skin 1 (one) time per week. 3 mL 3    [DISCONTINUED] blood-glucose sensor (FreeStyle Shilpa 3 Plus Sensor) device CHECK bg 4 TIMES A DAY 2 each 3     No current facility-administered medications on file prior to visit.        Review of Systems   Constitutional:  Positive for weight loss. Negative for fatigue.   Eyes:  Negative for blurred vision.   Cardiovascular:  Negative for chest pain.   Endocrine: Negative for polydipsia and polyphagia.   Neurological:  Negative for dizziness, speech difficulty and headaches.   Psychiatric/Behavioral:  Negative for confusion. The patient is not nervous/anxious.        Objective   /83   Pulse 92   Ht 1.676 m (5' 6\")   Wt 88.6 kg (195 lb 6.4 oz)   SpO2 98%   BMI 31.54 kg/m²   BSA: 2.03 meters squared  Growth percentiles: Facility age limit for growth %andrew is 20 years. Facility age limit for " growth %andrew is 20 years.   Office Visit on 03/14/2025   Component Date Value Ref Range Status    POC HEMOGLOBIN A1c 03/14/2025 5.3  4.2 - 6.5 % Final      Physical Exam  Constitutional:       General: She is not in acute distress.  Eyes:      Extraocular Movements: Extraocular movements intact.   Cardiovascular:      Rate and Rhythm: Normal rate and regular rhythm.   Pulmonary:      Breath sounds: Normal breath sounds.   Abdominal:      General: Bowel sounds are normal.   Musculoskeletal:         General: Normal range of motion.      Cervical back: No rigidity.   Skin:     Findings: No rash.   Neurological:      General: No focal deficit present.      Mental Status: She is alert.   Psychiatric:         Thought Content: Thought content normal.         Assessment/Plan   Problem List Items Addressed This Visit       Routine general medical examination at a health care facility    Relevant Orders    Lipid Panel    CBC    TSH with reflex to Free T4 if abnormal    Hemoglobin A1C    Comprehensive Metabolic Panel    Vitamin B12    Type 2 diabetes mellitus without complication, without long-term current use of insulin (Multi)    Relevant Orders    Lipid Panel    CBC    TSH with reflex to Free T4 if abnormal    Hemoglobin A1C    Comprehensive Metabolic Panel    Vitamin B12    Pure hypercholesterolemia     Check labs continue to eat healthy         Screening for thyroid disorder    Relevant Orders    Lipid Panel    CBC    TSH with reflex to Free T4 if abnormal    Hemoglobin A1C    Comprehensive Metabolic Panel    Vitamin B12    Type 2 diabetes mellitus with hypoglycemia without coma, without long-term current use of insulin - Primary    Relevant Medications    blood-glucose sensor (FreeStyle Shilpa 3 Plus Sensor) device    Other Relevant Orders    Lipid Panel    CBC    TSH with reflex to Free T4 if abnormal    Hemoglobin A1C    Comprehensive Metabolic Panel    Vitamin B12    POCT glycosylated hemoglobin (Hb A1C) manually  resulted (Completed)     Other Visit Diagnoses       Screening for diabetes mellitus        Relevant Orders    Lipid Panel    CBC    TSH with reflex to Free T4 if abnormal    Hemoglobin A1C    Comprehensive Metabolic Panel    Vitamin B12    Screening for lipid disorders        Relevant Orders    Lipid Panel    CBC    TSH with reflex to Free T4 if abnormal    Hemoglobin A1C    Comprehensive Metabolic Panel    Vitamin B12

## 2025-03-15 VITALS
WEIGHT: 195.4 LBS | HEART RATE: 92 BPM | SYSTOLIC BLOOD PRESSURE: 122 MMHG | OXYGEN SATURATION: 98 % | BODY MASS INDEX: 31.4 KG/M2 | HEIGHT: 66 IN | DIASTOLIC BLOOD PRESSURE: 83 MMHG

## 2025-03-15 PROBLEM — E11.649 TYPE 2 DIABETES MELLITUS WITH HYPOGLYCEMIA WITHOUT COMA, WITHOUT LONG-TERM CURRENT USE OF INSULIN: Status: ACTIVE | Noted: 2025-03-15

## 2025-03-15 PROBLEM — Z13.29 SCREENING FOR THYROID DISORDER: Status: ACTIVE | Noted: 2025-03-15

## 2025-03-15 ASSESSMENT — ENCOUNTER SYMPTOMS
VISUAL CHANGE: 0
WEIGHT LOSS: 1
SWEATS: 0
POLYPHAGIA: 0
SPEECH DIFFICULTY: 0
BLURRED VISION: 0
FATIGUE: 0
NERVOUS/ANXIOUS: 0
HUNGER: 0
POLYDIPSIA: 0
HEADACHES: 0
CONFUSION: 0
DIZZINESS: 0

## 2025-06-08 DIAGNOSIS — E11.9 TYPE 2 DIABETES MELLITUS WITHOUT COMPLICATION, WITHOUT LONG-TERM CURRENT USE OF INSULIN: ICD-10-CM

## 2025-06-10 RX ORDER — SEMAGLUTIDE 2.68 MG/ML
2 INJECTION, SOLUTION SUBCUTANEOUS
Qty: 2 ML | Refills: 2 | Status: SHIPPED | OUTPATIENT
Start: 2025-06-15

## 2025-06-16 ENCOUNTER — APPOINTMENT (OUTPATIENT)
Dept: PRIMARY CARE | Facility: CLINIC | Age: 40
End: 2025-06-16
Payer: COMMERCIAL

## 2025-06-22 LAB
ALBUMIN SERPL-MCNC: 4.5 G/DL (ref 3.6–5.1)
ALP SERPL-CCNC: 58 U/L (ref 31–125)
ALT SERPL-CCNC: 11 U/L (ref 6–29)
ANION GAP SERPL CALCULATED.4IONS-SCNC: 11 MMOL/L (CALC) (ref 7–17)
AST SERPL-CCNC: 15 U/L (ref 10–30)
BILIRUB SERPL-MCNC: 0.6 MG/DL (ref 0.2–1.2)
BUN SERPL-MCNC: 12 MG/DL (ref 7–25)
CALCIUM SERPL-MCNC: 9.7 MG/DL (ref 8.6–10.2)
CHLORIDE SERPL-SCNC: 103 MMOL/L (ref 98–110)
CHOLEST SERPL-MCNC: 193 MG/DL
CHOLEST/HDLC SERPL: 3.8 (CALC)
CO2 SERPL-SCNC: 22 MMOL/L (ref 20–32)
CREAT SERPL-MCNC: 0.62 MG/DL (ref 0.5–0.97)
EGFRCR SERPLBLD CKD-EPI 2021: 116 ML/MIN/1.73M2
ERYTHROCYTE [DISTWIDTH] IN BLOOD BY AUTOMATED COUNT: 13 % (ref 11–15)
EST. AVERAGE GLUCOSE BLD GHB EST-MCNC: NORMAL MG/DL
EST. AVERAGE GLUCOSE BLD GHB EST-SCNC: NORMAL MMOL/L
GLUCOSE SERPL-MCNC: 82 MG/DL (ref 65–99)
HBA1C MFR BLD: NORMAL %
HCT VFR BLD AUTO: 42.3 % (ref 35–45)
HDLC SERPL-MCNC: 51 MG/DL
HGB BLD-MCNC: 13.8 G/DL (ref 11.7–15.5)
LDLC SERPL CALC-MCNC: 120 MG/DL (CALC)
MCH RBC QN AUTO: 29.4 PG (ref 27–33)
MCHC RBC AUTO-ENTMCNC: 32.6 G/DL (ref 32–36)
MCV RBC AUTO: 90 FL (ref 80–100)
NONHDLC SERPL-MCNC: 142 MG/DL (CALC)
PLATELET # BLD AUTO: 256 THOUSAND/UL (ref 140–400)
PMV BLD REES-ECKER: 9.6 FL (ref 7.5–12.5)
POTASSIUM SERPL-SCNC: 4.4 MMOL/L (ref 3.5–5.3)
PROT SERPL-MCNC: 6.9 G/DL (ref 6.1–8.1)
RBC # BLD AUTO: 4.7 MILLION/UL (ref 3.8–5.1)
SODIUM SERPL-SCNC: 136 MMOL/L (ref 135–146)
TRIGL SERPL-MCNC: 111 MG/DL
TSH SERPL-ACNC: 1.02 MIU/L
VIT B12 SERPL-MCNC: 324 PG/ML (ref 200–1100)
WBC # BLD AUTO: 6.9 THOUSAND/UL (ref 3.8–10.8)

## 2025-06-24 ENCOUNTER — APPOINTMENT (OUTPATIENT)
Dept: PRIMARY CARE | Facility: CLINIC | Age: 40
End: 2025-06-24
Payer: COMMERCIAL

## 2025-06-24 VITALS
DIASTOLIC BLOOD PRESSURE: 85 MMHG | BODY MASS INDEX: 32.47 KG/M2 | WEIGHT: 202 LBS | SYSTOLIC BLOOD PRESSURE: 127 MMHG | HEART RATE: 88 BPM | HEIGHT: 66 IN

## 2025-06-24 DIAGNOSIS — E11.649 TYPE 2 DIABETES MELLITUS WITH HYPOGLYCEMIA WITHOUT COMA, WITHOUT LONG-TERM CURRENT USE OF INSULIN: ICD-10-CM

## 2025-06-24 DIAGNOSIS — J30.9 ALLERGIC RHINITIS, UNSPECIFIED SEASONALITY, UNSPECIFIED TRIGGER: ICD-10-CM

## 2025-06-24 DIAGNOSIS — Z00.00 ROUTINE GENERAL MEDICAL EXAMINATION AT A HEALTH CARE FACILITY: Primary | ICD-10-CM

## 2025-06-24 LAB
ALBUMIN SERPL-MCNC: 4.5 G/DL (ref 3.6–5.1)
ALP SERPL-CCNC: 58 U/L (ref 31–125)
ALT SERPL-CCNC: 11 U/L (ref 6–29)
ANION GAP SERPL CALCULATED.4IONS-SCNC: 11 MMOL/L (CALC) (ref 7–17)
AST SERPL-CCNC: 15 U/L (ref 10–30)
BILIRUB SERPL-MCNC: 0.6 MG/DL (ref 0.2–1.2)
BUN SERPL-MCNC: 12 MG/DL (ref 7–25)
CALCIUM SERPL-MCNC: 9.7 MG/DL (ref 8.6–10.2)
CHLORIDE SERPL-SCNC: 103 MMOL/L (ref 98–110)
CHOLEST SERPL-MCNC: 193 MG/DL
CHOLEST/HDLC SERPL: 3.8 (CALC)
CO2 SERPL-SCNC: 22 MMOL/L (ref 20–32)
CREAT SERPL-MCNC: 0.62 MG/DL (ref 0.5–0.97)
EGFRCR SERPLBLD CKD-EPI 2021: 116 ML/MIN/1.73M2
ERYTHROCYTE [DISTWIDTH] IN BLOOD BY AUTOMATED COUNT: 13 % (ref 11–15)
EST. AVERAGE GLUCOSE BLD GHB EST-MCNC: 108 MG/DL
EST. AVERAGE GLUCOSE BLD GHB EST-SCNC: 6 MMOL/L
GLUCOSE SERPL-MCNC: 82 MG/DL (ref 65–99)
HBA1C MFR BLD: 5.4 %
HCT VFR BLD AUTO: 42.3 % (ref 35–45)
HDLC SERPL-MCNC: 51 MG/DL
HGB BLD-MCNC: 13.8 G/DL (ref 11.7–15.5)
LDLC SERPL CALC-MCNC: 120 MG/DL (CALC)
MCH RBC QN AUTO: 29.4 PG (ref 27–33)
MCHC RBC AUTO-ENTMCNC: 32.6 G/DL (ref 32–36)
MCV RBC AUTO: 90 FL (ref 80–100)
NONHDLC SERPL-MCNC: 142 MG/DL (CALC)
PLATELET # BLD AUTO: 256 THOUSAND/UL (ref 140–400)
PMV BLD REES-ECKER: 9.6 FL (ref 7.5–12.5)
POTASSIUM SERPL-SCNC: 4.4 MMOL/L (ref 3.5–5.3)
PROT SERPL-MCNC: 6.9 G/DL (ref 6.1–8.1)
RBC # BLD AUTO: 4.7 MILLION/UL (ref 3.8–5.1)
SODIUM SERPL-SCNC: 136 MMOL/L (ref 135–146)
TRIGL SERPL-MCNC: 111 MG/DL
TSH SERPL-ACNC: 1.02 MIU/L
VIT B12 SERPL-MCNC: 324 PG/ML (ref 200–1100)
WBC # BLD AUTO: 6.9 THOUSAND/UL (ref 3.8–10.8)

## 2025-06-24 PROCEDURE — 3044F HG A1C LEVEL LT 7.0%: CPT | Performed by: FAMILY MEDICINE

## 2025-06-24 PROCEDURE — 3079F DIAST BP 80-89 MM HG: CPT | Performed by: FAMILY MEDICINE

## 2025-06-24 PROCEDURE — 3074F SYST BP LT 130 MM HG: CPT | Performed by: FAMILY MEDICINE

## 2025-06-24 PROCEDURE — 99395 PREV VISIT EST AGE 18-39: CPT | Performed by: FAMILY MEDICINE

## 2025-06-24 PROCEDURE — 3008F BODY MASS INDEX DOCD: CPT | Performed by: FAMILY MEDICINE

## 2025-06-24 RX ORDER — ROSUVASTATIN CALCIUM 5 MG/1
5 TABLET, COATED ORAL DAILY
Qty: 30 TABLET | Refills: 0 | Status: SHIPPED | OUTPATIENT
Start: 2025-06-24 | End: 2025-07-24

## 2025-06-24 RX ORDER — FLUTICASONE PROPIONATE 50 MCG
2 SPRAY, SUSPENSION (ML) NASAL DAILY
Qty: 16 G | Refills: 2 | Status: SHIPPED | OUTPATIENT
Start: 2025-06-24

## 2025-06-24 ASSESSMENT — ENCOUNTER SYMPTOMS
DEPRESSION: 0
OCCASIONAL FEELINGS OF UNSTEADINESS: 0
LOSS OF SENSATION IN FEET: 0

## 2025-06-25 LAB
ALBUMIN/CREAT UR: 2 MG/G CREAT
CREAT UR-MCNC: 102 MG/DL (ref 20–275)
MICROALBUMIN UR-MCNC: 0.2 MG/DL

## 2025-07-05 PROBLEM — J30.9 ALLERGIC RHINITIS: Status: ACTIVE | Noted: 2025-07-05

## 2025-07-05 NOTE — PROGRESS NOTES
"  Subjective     Patient ID: Jessi Tejeda is a 40 y.o. female who presents for Annual Exam.      Last Physical :1 Years ago     Pt's PMH, PSH, SH, FH , meds and allergies was obtained / reviewed and updated .     Dental visits : Y  Vision issues : N  Hearing issues : N    Immunizations : Y    Diet :  could be better  Exercise:  Weight concerns :     Alcohol: as noted in SH  Tobacco: as noted in SH  Recreational drug use : None/ as noted in SH    Sexually active : Active   Contraception :    Menstrual problems: none  Premenopausal/perimenopausal/ postmenopausal:      Parity:2  Full term:    Premature:   (s):   Living :  Ab induced:   Ab spontaneous :  Ectopic :   Multiple :    PAP smear : will call GYN  Mammogram :      Metabolic screening   - Lipid   - Glucose    DM well controlled.     ======================================================    Visit Vitals  /85   Pulse 88   Ht 1.676 m (5' 6\")   Wt 91.6 kg (202 lb)   BMI 32.60 kg/m²   OB Status Having periods   Smoking Status Never   BSA 2.07 m²      No LMP recorded.     =====================================    Review of systems:  Constitutional: no chills, no fever and no night sweats.     Eyes: no blurred vision and no eyesight problems.     ENT: no hearing loss, no nasal congestion, no nasal discharge, no hoarseness and no sore throat.     Cardiovascular: no chest pain, no intermittent leg claudication, no lower extremity edema, no palpitations and no syncope.     Respiratory: no cough, no shortness of breath during exertion, no shortness of breath at rest and no wheezing.     Gastrointestinal: no abdominal pain, no blood in stools, no constipation, no diarrhea, no melena, no nausea, no rectal pain and no vomiting.     Genitourinary: no dysuria, no change in urinary frequency, no urinary hesitancy, no feelings of urinary urgency and no vaginal discharge.     Musculoskeletal: no arthralgias, no back pain and no myalgias.     Integumentary: no " new skin lesions and no rashes.     Neurological: no difficulty walking, no headache, no limb weakness, no numbness and no tingling.     Psychiatric: no anxiety, no depression, no anhedonia and no substance use disorders.     Endocrine: no recent weight gain and no recent weight loss.     Hematologic/Lymphatic: no tendency for easy bruising and no swollen glands.   ============================================================    Physical exam :    Constitutional: Alert and in no acute distress. Well developed, well nourished.     Eyes: Normal external exam. Pupils were equal in size, round, reactive to light (PERRL) with normal accommodation and extraocular movements intact (EOMI).     Ears, Nose, Mouth, and Throat: External inspection of ears and nose: Normal.  Otoscopic examination: Normal.      Neck: No neck mass was observed. Supple.     Cardiovascular: Heart rate and rhythm were normal, normal S1 and S2, no gallops, no murmurs and no pericardial rub    Pulmonary: No respiratory distress. Clear bilateral breath sounds.     Abdomen: Soft nontender; no abdominal mass palpated. No organomegaly.     Musculoskeletal: No joint swelling seen, normal movements of all extremities. Range of motion: Normal.  Muscle strength/tone: Normal.      Skin: Normal skin color and pigmentation, normal skin turgor, and no rash.     Neurologic: Deep tendon reflexes were 2+ and symmetric. Sensation: Normal.     Psychiatric: Judgment and insight: Intact. Mood and affect: Normal.    Lymphatic : Cervical/ axillary/ groin Lns Palpable/ non palpable            All other systems have been reviewed and are negative for complaint.      Assessment/Plan    Problem List Items Addressed This Visit           ICD-10-CM    Routine general medical examination at a health care facility - Primary Z00.00    Type 2 diabetes mellitus with hypoglycemia without coma, without long-term current use of insulin E11.649    Relevant Medications    rosuvastatin  (Crestor) 5 mg tablet    Other Relevant Orders    Albumin-Creatinine Ratio, Urine Random (Completed)    Allergic rhinitis J30.9    Relevant Medications    fluticasone (Flonase) 50 mcg/actuation nasal spray

## 2025-07-29 DIAGNOSIS — J30.9 ALLERGIC RHINITIS, UNSPECIFIED SEASONALITY, UNSPECIFIED TRIGGER: ICD-10-CM

## 2025-07-29 RX ORDER — FLUTICASONE PROPIONATE 50 MCG
2 SPRAY, SUSPENSION (ML) NASAL DAILY
Qty: 16 ML | Refills: 1 | Status: SHIPPED | OUTPATIENT
Start: 2025-07-29

## 2025-09-04 DIAGNOSIS — E11.9 TYPE 2 DIABETES MELLITUS WITHOUT COMPLICATION, WITHOUT LONG-TERM CURRENT USE OF INSULIN: ICD-10-CM

## 2025-09-04 RX ORDER — METFORMIN HYDROCHLORIDE 500 MG/1
500 TABLET ORAL
Qty: 180 TABLET | Refills: 2 | Status: SHIPPED | OUTPATIENT
Start: 2025-09-04 | End: 2026-10-09

## 2025-09-24 ENCOUNTER — APPOINTMENT (OUTPATIENT)
Dept: PRIMARY CARE | Facility: CLINIC | Age: 40
End: 2025-09-24
Payer: COMMERCIAL